# Patient Record
Sex: MALE | Race: WHITE | NOT HISPANIC OR LATINO | Employment: FULL TIME | URBAN - METROPOLITAN AREA
[De-identification: names, ages, dates, MRNs, and addresses within clinical notes are randomized per-mention and may not be internally consistent; named-entity substitution may affect disease eponyms.]

---

## 2020-10-20 ENCOUNTER — COMMUNICATION - HEALTHEAST (OUTPATIENT)
Dept: SCHEDULING | Facility: CLINIC | Age: 50
End: 2020-10-20

## 2020-10-21 ENCOUNTER — COMMUNICATION - HEALTHEAST (OUTPATIENT)
Dept: SCHEDULING | Facility: CLINIC | Age: 50
End: 2020-10-21

## 2020-10-21 ENCOUNTER — OFFICE VISIT - HEALTHEAST (OUTPATIENT)
Dept: INTERNAL MEDICINE | Facility: CLINIC | Age: 50
End: 2020-10-21

## 2020-10-21 DIAGNOSIS — Z86.0100 PERSONAL HISTORY OF COLONIC POLYPS: ICD-10-CM

## 2020-10-21 DIAGNOSIS — S42.002S CLOSED NONDISPLACED FRACTURE OF LEFT CLAVICLE, UNSPECIFIED PART OF CLAVICLE, SEQUELA: ICD-10-CM

## 2020-10-21 DIAGNOSIS — Z85.47 PERSONAL HISTORY OF TESTICULAR CANCER: ICD-10-CM

## 2020-10-21 DIAGNOSIS — Z80.0 FAMILY HISTORY OF COLON CANCER: ICD-10-CM

## 2020-10-21 DIAGNOSIS — Z00.00 ROUTINE GENERAL MEDICAL EXAMINATION AT A HEALTH CARE FACILITY: ICD-10-CM

## 2020-10-21 DIAGNOSIS — Z12.5 SCREENING PSA (PROSTATE SPECIFIC ANTIGEN): ICD-10-CM

## 2020-10-21 DIAGNOSIS — S27.321S CONTUSION OF LEFT LUNG, SEQUELA: ICD-10-CM

## 2020-10-21 DIAGNOSIS — S22.42XD CLOSED FRACTURE OF MULTIPLE RIBS OF LEFT SIDE WITH ROUTINE HEALING: ICD-10-CM

## 2020-10-21 RX ORDER — ACETAMINOPHEN 500 MG
TABLET ORAL
Status: SHIPPED | COMMUNITY
Start: 2020-10-05 | End: 2021-08-11

## 2020-10-21 RX ORDER — CYCLOBENZAPRINE HCL 5 MG
5 TABLET ORAL 3 TIMES DAILY PRN
Qty: 30 TABLET | Refills: 2 | Status: SHIPPED | OUTPATIENT
Start: 2020-10-21 | End: 2021-08-11

## 2020-10-21 RX ORDER — IBUPROFEN 200 MG
TABLET ORAL
Status: SHIPPED | COMMUNITY
Start: 2020-10-05 | End: 2021-08-11

## 2020-10-21 ASSESSMENT — MIFFLIN-ST. JEOR: SCORE: 2062.81

## 2020-10-22 ENCOUNTER — COMMUNICATION - HEALTHEAST (OUTPATIENT)
Dept: INTERNAL MEDICINE | Facility: CLINIC | Age: 50
End: 2020-10-22

## 2020-10-26 ENCOUNTER — COMMUNICATION - HEALTHEAST (OUTPATIENT)
Dept: INTERNAL MEDICINE | Facility: CLINIC | Age: 50
End: 2020-10-26

## 2020-10-28 ENCOUNTER — AMBULATORY - HEALTHEAST (OUTPATIENT)
Dept: LAB | Facility: CLINIC | Age: 50
End: 2020-10-28

## 2020-10-28 ENCOUNTER — COMMUNICATION - HEALTHEAST (OUTPATIENT)
Dept: INTERNAL MEDICINE | Facility: CLINIC | Age: 50
End: 2020-10-28

## 2020-10-28 DIAGNOSIS — Z00.00 ROUTINE GENERAL MEDICAL EXAMINATION AT A HEALTH CARE FACILITY: ICD-10-CM

## 2020-10-28 DIAGNOSIS — Z12.5 SCREENING PSA (PROSTATE SPECIFIC ANTIGEN): ICD-10-CM

## 2020-10-28 LAB
ALBUMIN SERPL-MCNC: 4.2 G/DL (ref 3.5–5)
ALP SERPL-CCNC: 119 U/L (ref 45–120)
ALT SERPL W P-5'-P-CCNC: 21 U/L (ref 0–45)
ANION GAP SERPL CALCULATED.3IONS-SCNC: 11 MMOL/L (ref 5–18)
AST SERPL W P-5'-P-CCNC: 19 U/L (ref 0–40)
BILIRUB SERPL-MCNC: 0.4 MG/DL (ref 0–1)
BUN SERPL-MCNC: 14 MG/DL (ref 8–22)
CALCIUM SERPL-MCNC: 9.9 MG/DL (ref 8.5–10.5)
CHLORIDE BLD-SCNC: 104 MMOL/L (ref 98–107)
CHOLEST SERPL-MCNC: 264 MG/DL
CO2 SERPL-SCNC: 26 MMOL/L (ref 22–31)
CREAT SERPL-MCNC: 0.78 MG/DL (ref 0.7–1.3)
ERYTHROCYTE [DISTWIDTH] IN BLOOD BY AUTOMATED COUNT: 11.9 % (ref 11–14.5)
FASTING STATUS PATIENT QL REPORTED: YES
GFR SERPL CREATININE-BSD FRML MDRD: >60 ML/MIN/1.73M2
GLUCOSE BLD-MCNC: 96 MG/DL (ref 70–125)
HCT VFR BLD AUTO: 39.6 % (ref 40–54)
HDLC SERPL-MCNC: 60 MG/DL
HGB BLD-MCNC: 13.2 G/DL (ref 14–18)
LDLC SERPL CALC-MCNC: 176 MG/DL
MCH RBC QN AUTO: 30.7 PG (ref 27–34)
MCHC RBC AUTO-ENTMCNC: 33.3 G/DL (ref 32–36)
MCV RBC AUTO: 92 FL (ref 80–100)
PLATELET # BLD AUTO: 340 THOU/UL (ref 140–440)
PMV BLD AUTO: 7.3 FL (ref 7–10)
POTASSIUM BLD-SCNC: 4.6 MMOL/L (ref 3.5–5)
PROT SERPL-MCNC: 7.5 G/DL (ref 6–8)
PSA SERPL-MCNC: 0.6 NG/ML (ref 0–3.5)
RBC # BLD AUTO: 4.28 MILL/UL (ref 4.4–6.2)
SODIUM SERPL-SCNC: 141 MMOL/L (ref 136–145)
TRIGL SERPL-MCNC: 138 MG/DL
WBC: 6.6 THOU/UL (ref 4–11)

## 2020-11-18 ENCOUNTER — OFFICE VISIT - HEALTHEAST (OUTPATIENT)
Dept: INTERNAL MEDICINE | Facility: CLINIC | Age: 50
End: 2020-11-18

## 2020-11-18 DIAGNOSIS — E78.5 HYPERLIPIDEMIA LDL GOAL <100: ICD-10-CM

## 2020-11-18 DIAGNOSIS — S42.002S CLOSED NONDISPLACED FRACTURE OF LEFT CLAVICLE, UNSPECIFIED PART OF CLAVICLE, SEQUELA: ICD-10-CM

## 2020-11-18 DIAGNOSIS — S22.42XD CLOSED FRACTURE OF MULTIPLE RIBS OF LEFT SIDE WITH ROUTINE HEALING: ICD-10-CM

## 2020-11-18 DIAGNOSIS — S27.321S CONTUSION OF LEFT LUNG, SEQUELA: ICD-10-CM

## 2020-12-14 ENCOUNTER — OFFICE VISIT - HEALTHEAST (OUTPATIENT)
Dept: PHYSICAL THERAPY | Facility: REHABILITATION | Age: 50
End: 2020-12-14

## 2020-12-14 DIAGNOSIS — M25.519 PAIN IN JOINT, SHOULDER REGION: ICD-10-CM

## 2020-12-14 DIAGNOSIS — S42.002A CLOSED FRACTURE OF LEFT CLAVICLE: ICD-10-CM

## 2020-12-14 DIAGNOSIS — S22.42XD CLOSED FRACTURE OF MULTIPLE RIBS OF LEFT SIDE WITH ROUTINE HEALING: ICD-10-CM

## 2020-12-14 DIAGNOSIS — M25.512 PAIN IN JOINT OF LEFT SHOULDER: ICD-10-CM

## 2020-12-14 DIAGNOSIS — M25.612 DECREASED RANGE OF MOTION OF LEFT SHOULDER: ICD-10-CM

## 2020-12-14 DIAGNOSIS — S42.002D CLOSED DISPLACED FRACTURE OF LEFT CLAVICLE WITH ROUTINE HEALING, UNSPECIFIED PART OF CLAVICLE, SUBSEQUENT ENCOUNTER: ICD-10-CM

## 2020-12-14 DIAGNOSIS — R07.81 RIB PAIN ON LEFT SIDE: ICD-10-CM

## 2020-12-17 ENCOUNTER — COMMUNICATION - HEALTHEAST (OUTPATIENT)
Dept: PHYSICAL THERAPY | Facility: REHABILITATION | Age: 50
End: 2020-12-17

## 2020-12-21 ENCOUNTER — OFFICE VISIT - HEALTHEAST (OUTPATIENT)
Dept: PHYSICAL THERAPY | Facility: REHABILITATION | Age: 50
End: 2020-12-21

## 2020-12-21 DIAGNOSIS — S42.002A CLOSED NONDISPLACED FRACTURE OF LEFT CLAVICLE, UNSPECIFIED PART OF CLAVICLE, INITIAL ENCOUNTER: ICD-10-CM

## 2020-12-21 DIAGNOSIS — S22.42XD CLOSED FRACTURE OF MULTIPLE RIBS OF LEFT SIDE WITH ROUTINE HEALING: ICD-10-CM

## 2020-12-21 DIAGNOSIS — R07.81 RIB PAIN ON LEFT SIDE: ICD-10-CM

## 2020-12-21 DIAGNOSIS — M25.612 DECREASED RANGE OF MOTION OF LEFT SHOULDER: ICD-10-CM

## 2020-12-21 DIAGNOSIS — M25.512 PAIN IN JOINT OF LEFT SHOULDER: ICD-10-CM

## 2020-12-28 ENCOUNTER — OFFICE VISIT - HEALTHEAST (OUTPATIENT)
Dept: PHYSICAL THERAPY | Facility: REHABILITATION | Age: 50
End: 2020-12-28

## 2020-12-28 DIAGNOSIS — R07.81 RIB PAIN ON LEFT SIDE: ICD-10-CM

## 2020-12-28 DIAGNOSIS — S22.42XD CLOSED FRACTURE OF MULTIPLE RIBS OF LEFT SIDE WITH ROUTINE HEALING: ICD-10-CM

## 2020-12-28 DIAGNOSIS — M25.612 DECREASED RANGE OF MOTION OF LEFT SHOULDER: ICD-10-CM

## 2020-12-28 DIAGNOSIS — M25.512 PAIN IN JOINT OF LEFT SHOULDER: ICD-10-CM

## 2020-12-28 DIAGNOSIS — S42.002A CLOSED NONDISPLACED FRACTURE OF LEFT CLAVICLE, UNSPECIFIED PART OF CLAVICLE, INITIAL ENCOUNTER: ICD-10-CM

## 2021-01-11 ENCOUNTER — COMMUNICATION - HEALTHEAST (OUTPATIENT)
Dept: PHYSICAL THERAPY | Facility: REHABILITATION | Age: 51
End: 2021-01-11

## 2021-01-25 ENCOUNTER — OFFICE VISIT - HEALTHEAST (OUTPATIENT)
Dept: PHYSICAL THERAPY | Facility: REHABILITATION | Age: 51
End: 2021-01-25

## 2021-01-25 DIAGNOSIS — S22.42XD CLOSED FRACTURE OF MULTIPLE RIBS OF LEFT SIDE WITH ROUTINE HEALING: ICD-10-CM

## 2021-01-25 DIAGNOSIS — S42.002A CLOSED NONDISPLACED FRACTURE OF LEFT CLAVICLE, UNSPECIFIED PART OF CLAVICLE, INITIAL ENCOUNTER: ICD-10-CM

## 2021-01-25 DIAGNOSIS — R07.81 RIB PAIN ON LEFT SIDE: ICD-10-CM

## 2021-01-25 DIAGNOSIS — M25.512 PAIN IN JOINT OF LEFT SHOULDER: ICD-10-CM

## 2021-01-25 DIAGNOSIS — M25.612 DECREASED RANGE OF MOTION OF LEFT SHOULDER: ICD-10-CM

## 2021-04-20 ENCOUNTER — AMBULATORY - HEALTHEAST (OUTPATIENT)
Dept: NURSING | Facility: CLINIC | Age: 51
End: 2021-04-20

## 2021-05-11 ENCOUNTER — AMBULATORY - HEALTHEAST (OUTPATIENT)
Dept: NURSING | Facility: CLINIC | Age: 51
End: 2021-05-11

## 2021-06-05 VITALS
BODY MASS INDEX: 27.5 KG/M2 | DIASTOLIC BLOOD PRESSURE: 86 MMHG | HEART RATE: 66 BPM | WEIGHT: 238 LBS | SYSTOLIC BLOOD PRESSURE: 120 MMHG | OXYGEN SATURATION: 97 %

## 2021-06-05 VITALS
WEIGHT: 238 LBS | SYSTOLIC BLOOD PRESSURE: 141 MMHG | DIASTOLIC BLOOD PRESSURE: 93 MMHG | HEART RATE: 68 BPM | OXYGEN SATURATION: 99 % | BODY MASS INDEX: 27.54 KG/M2 | HEIGHT: 78 IN

## 2021-06-12 NOTE — TELEPHONE ENCOUNTER
Who is calling:  Patient   Reason for Call:  Caller Date of birth is incorrect its suppose to be 1970 and not [birth year]. Caller is needing to know if Dr. Green received his hospital records from [place], since he has an appointment today with him to go over hospital follow up.   Date of last appointment with primary care:   Okay to leave a detailed message: Yes
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
## 2021-06-12 NOTE — TELEPHONE ENCOUNTER
Upcoming Appointment Question  When is the appointment: Tomorrow  What is your appointment for?: Post-op folllow-up  Who is your appointment scheduled with?: CHARMAINE Green  What is your question/concern?: Had hospital information faxed from [facility], is calling to confirm that the information has been received for his appointment.  Please confirm with patient.  Okay to leave a detailed message?: Yes

## 2021-06-12 NOTE — TELEPHONE ENCOUNTER
FYI - Status Update  Who is Calling: Spouse  Update: Rachel was checking the status of this request. Caller was informed Dr. Green documented yesterday (see the other encounter from 10/20) that he did receive the notes from [place]. Caller verbalized her understanding and had no further question.  Okay to leave a detailed message?:  No return call needed
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
## 2021-06-12 NOTE — TELEPHONE ENCOUNTER
Left message for patient that we have these records. Registration will fix  when patient gets here for appointment.  Yanelis Daigle CMA ............... 2:41 PM, 10/21/20
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
## 2021-06-13 NOTE — PROGRESS NOTES
[facility] Daily Progress     Patient Name: Woody Clancy  Date: 12/21/2020  Visit #: 2/12  Referring Provider: Giuseppe Green MD  Referring Diagnosis:   Closed nondisplaced fracture of left clavicle, unspecified part of clavicle, sequela [S42.002S]  - Primary       Closed fracture of multiple ribs of left side with routine healing [S22.42XD]         Visit Diagnosis:     ICD-10-CM    1. Pain in joint of left shoulder  M25.512    2. Rib pain on left side  R07.81    3. Closed fracture of multiple ribs of left side with routine healing  S22.42XD    4. Decreased range of motion of left shoulder  M25.612    5. Closed nondisplaced fracture of left clavicle, unspecified part of clavicle, initial encounter  S42.002A        Assessment:     Pt with good improvement to L shoulder ROM with 1 week with HEP with minimal increase to pain short term.    Pt continues to demo's L shoulder elevation difficulty with AC jt and ribs.    Patient is benefitting from skilled physical therapy and is making steady progress toward functional goals.  Patient is appropriate to continue with skilled physical therapy intervention, as indicated by initial plan of care.    Goal Status:  Pt. will demonstrate/verbalize independence in self-management of condition in : 12 weeks;Comment - IMPROVING - pt reports compliance    Pt. will have improved quality of sleep: with less pain;in 12 weeks;Comment  Comment:: be able to lay on his L side without difficulty in 12 weeks for improved QOL - on-going - still painful    Patient will reach / maintain arm movement: forward;overhead;behind;for work;for home chores;for dressing;with less pain;with less difficulty;in 12 weeks;Comment  Comment: with full range of motion for ease with ADLs with L arm - IMPROVING - better range    Pt will: be able to bend and twist without difficulty and with pain <1/10 for improved quality with functional mobility in 12 weeks. - on going      Plan / Patient  Education:     Continue with initial plan of care.  Assess response to adding s/l scap plane abd.  Reassess L shoulder and thoracic ROM and progress with AROM as able.    Exercises:  Exercise #1: Supine shoulder flexion AAROM x10 reps 2-3x/day  Comment #1: Standing scap plane abd AAROM with cane x10 reps 2-3x/day  Exercise #2: Standing AAROM shoulder ER x10 reps 2-3x/day  Comment #2: S/L L shoulder scap plane abd x10 reps 1-2x/day          Subjective:     Pain Ratin/10  Pt reports his L shoulder was a little achy with starting new exercises but this feeling has gotten better since practicing.  Pt can still feel some rib pain with movements but not too bad.    Patient Outcome Measures:  Shoulder Pain and Disability Index (SPADI) in %: 9     Scores range from 0-100%, where a score of 0% represents minimal pain and maximal function. The minimal clincically important difference is a score reduction of 10%.    Objective:      L shoulder flex 117 degrees with tightness end range, abd 114 degrees with tightness and pain in ribs and shoulder, ER 65 degrees with tightness (was same), IR T10 with tightness and min arm pain  (was same)    Treatment Today      TREATMENT MINUTES COMMENTS   Evaluation     Self-care/ Home management     Manual therapy     Neuromuscular Re-education     Therapeutic Activity     Therapeutic Exercises 28 Assessed response to HEP and activity level. Reassessed L shoulder ROM and discussed progress.  Performed PROM supine L shoulder scap plane abd, ER/IR and s/l scap protraction/retraction x20 reps x2 each. Added to HEP per pt instructions and flow sheet and printed AVS for home.   Gait training     Modality__________________                Total 28    Blank areas are intentional and mean the treatment did not include these items.       Marry Drake PT, DPT, OCS, CLT  2020  3:37 PM

## 2021-06-14 NOTE — PROGRESS NOTES
[facility] Daily Progress     Patient Name: Woody Clancy  Date: 12/28/2020  Visit #: 3/12  Referring Provider: Giuseppe Green MD  Referring Diagnosis:   Closed nondisplaced fracture of left clavicle, unspecified part of clavicle, sequela [S42.002S]  - Primary       Closed fracture of multiple ribs of left side with routine healing [S22.42XD]         Visit Diagnosis:     ICD-10-CM    1. Pain in joint of left shoulder  M25.512    2. Rib pain on left side  R07.81    3. Closed fracture of multiple ribs of left side with routine healing  S22.42XD    4. Decreased range of motion of left shoulder  M25.612    5. Closed nondisplaced fracture of left clavicle, unspecified part of clavicle, initial encounter  S42.002A        Assessment:     Pt with continued improvement to L shoulder ROM with 2 weeks with HEP with minimal increase to pain short term.    Pt continues to demo's L shoulder elevation difficulty with AC jt and rib/trunk stiffness.    Patient is benefitting from skilled physical therapy and is making steady progress toward functional goals.  Patient is appropriate to continue with skilled physical therapy intervention, as indicated by initial plan of care.    Goal Status:  Pt. will demonstrate/verbalize independence in self-management of condition in : 12 weeks;Comment - IMPROVING - pt reports compliance    Pt. will have improved quality of sleep: with less pain;in 12 weeks;Comment  Comment:: be able to lay on his L side without difficulty in 12 weeks for improved QOL - IMPROVING - pt is able to now sleep in bed but can get increased pain if he tries to lay on left side    Patient will reach / maintain arm movement: forward;overhead;behind;for work;for home chores;for dressing;with less pain;with less difficulty;in 12 weeks;Comment  Comment: with full range of motion for ease with ADLs with L arm - IMPROVING - better range    Pt will: be able to bend and twist without difficulty and with pain  <1/10 for improved quality with functional mobility in 12 weeks. - IMPROVING - has been bending with minimal pain and some tightness    Plan / Patient Education:     Continue with initial plan of care.  Assess response to adding child's pose and tricep stretch.    Reassess L shoulder and thoracic ROM and progress with AROM and strength and finalize program if doing well.    Exercises:  Exercise #1: Supine shoulder flexion AAROM x10 reps 2-3x/day  Comment #1: Standing scap plane abd AAROM with cane x10 reps 2-3x/day  Exercise #2: Standing AAROM shoulder ER x10 reps 2-3x/day  Comment #2: S/L L shoulder scap plane abd x10 reps 1-2x/day      Subjective:     Pain Ratin/10  Pt reports pain only bothers mildly with some of his stretches - otherwise not having difficulty with pain.  Pt feels like he continues to gain with his flexibility.  Pt is sleeping in bed now but can have pain if he lays on his left side.    Patient Outcome Measures:  Shoulder Pain and Disability Index (SPADI) in %: 9     Scores range from 0-100%, where a score of 0% represents minimal pain and maximal function. The minimal clincically important difference is a score reduction of 10%.    Objective:      L shoulder flex 135 degrees with tightness end range (was 110-117), abd 145 degrees with tightness and pain in ribs and shoulder (was ), ER 65 degrees with tightness (was same - R side around 70 today)), IR T10 with tightness and min arm pain (was same)    Tightness L tricep, lat dorsi    Treatment Today      TREATMENT MINUTES COMMENTS   Evaluation     Self-care/ Home management     Manual therapy     Neuromuscular Re-education     Therapeutic Activity     Therapeutic Exercises 28 Reassessed response to HEP and activity levesl. Reassessed L shoulder ROM and discussed progress.  Performed PROM with overpressure supine L shoulder scap plane abd, ER/IR x20 reps x2 each. Added to HEP per pt instructions and flow sheet and printed AVS for home.    Gait training     Modality__________________                Total 28    Blank areas are intentional and mean the treatment did not include these items.       Marry Drake PT, DPT, OCS, CLT  12/28/2020  3:37 PM

## 2021-06-14 NOTE — PROGRESS NOTES
Progress Notes by Marry Drake PT at 1/25/2021 12:30 PM     Author: Marry Drake PT Service: -- Author Type: Physical Therapist    Filed: 7/6/2021  3:30 PM Encounter Date: 1/25/2021 Status: Addendum    : Marry Drake PT (Physical Therapist)    Related Notes: Original Note by Marry Drake PT (Physical Therapist) filed at 1/25/2021  2:30 PM           [facility] Discharge Summary    Pt was trialing I with HEP x60 days with goals mostly met - see note below. Pt did not return to PT. Will discharge this episode of therapy at this time.  Thank you for this referral!  Marry Drake PT, DPT, OCS, CLT      [facility] Daily Progress   Patient Name: Woody Clancy  Date: 1/25/2021  Visit #: 4/12  Referring Provider: Giuseppe Green MD  Referring Diagnosis:   Closed nondisplaced fracture of left clavicle, unspecified part of clavicle, sequela [S42.002S]  - Primary       Closed fracture of multiple ribs of left side with routine healing [S22.42XD]         Visit Diagnosis:     ICD-10-CM    1. Pain in joint of left shoulder  M25.512    2. Rib pain on left side  R07.81    3. Closed fracture of multiple ribs of left side with routine healing  S22.42XD    4. Decreased range of motion of left shoulder  M25.612    5. Closed nondisplaced fracture of left clavicle, unspecified part of clavicle, initial encounter  S42.002A        Assessment:     Pt with great improvement L shoulder ROM to almost WNL and minimal pain with ADLs.    Pt continues to demo's L shoulder elevation difficulty with AC jt and rib/trunk stiffness.    HEP/POC compliance is  good .  Patient demonstrates understanding/independence with home program.  Response to Intervention Great!    Goal Status:  Pt. will demonstrate/verbalize independence in self-management of condition in : 12 weeks;Comment - IMPROVING - pt reports compliance    Pt. will have improved quality of sleep: with less pain;in 12  weeks;Comment  Comment:: be able to lay on his L side without difficulty in 12 weeks for improved QOL - IMPROVING - getting better and pt up to about 50% of the time on L side    Patient will reach / maintain arm movement: forward;overhead;behind;for work;for home chores;for dressing;with less pain;with less difficulty;in 12 weeks;Comment  Comment: with full range of motion for ease with ADLs with L arm - IMPROVING - better range    Pt will: be able to bend and twist without difficulty and with pain <1/10 for improved quality with functional mobility in 12 weeks. - mostly MET    Plan / Patient Education:     Continue with initial plan of care.  Hold x60 days as pt attempts I with HEP with continued daily emphasis on stretching. If pt doesn't need to return will discharge with pt I with HEP.  Thank you for this referral!    Exercises:  Exercise #1: Supine shoulder flexion AROM  with overpressure x10 reps 1x/day  Comment #1: Standing scap plane abd AROM with overpressure x10 reps 1x/day  Exercise #2: Standing AAROM shoulder ER in doorway with overpressure x10-20 seconds x2-3 reps 1x/day  Comment #2: S/L L shoulder scap plane abd x10 reps 1x/day  Exercise #3: Child's pose with L SB emphasis x10-20 seconds x2-3 reps 1x/day  Comment #3: Tricep stretch x10-20 seconds x2-3 reps 1-2x/day  Exercise #4: Lat dorsi stretch with counter top x10-20 seconds x2-3 reps 1x/day      Subjective:     Pain Ratin/10 most of the time - with an extended stretch to shoulder or trunk pt can get 1/10 pain in L shoulder.    Pt feels like he is doing really well and getting much better.    Patient Outcome Measures:  Shoulder Pain and Disability Index (SPADI) in %: 9     Scores range from 0-100%, where a score of 0% represents minimal pain and maximal function. The minimal clincically important difference is a score reduction of 10%.    Objective:      L shoulder flex 145 degrees with tightness end range (was 110-117), abd 155 degrees with  tightness and pain in ribs and shoulder (was ), ER 65 degrees with tightness (was same - R side around 70 today)), IR T10 with tightness and min arm pain (was same)    L shoulder and UE grossly 5/5 without pain or difficulty    Tightness L tricep, lat dorsi    Treatment Today      TREATMENT MINUTES COMMENTS   Evaluation     Self-care/ Home management     Manual therapy     Neuromuscular Re-education     Therapeutic Activity     Therapeutic Exercises 26 Reassessed response to HEP and activity level. Reassessed L shoulder ROM and strength and discussed great progress.  Performed and added to HEP per pt instructions and flow sheet and printed AVS for home. Emphasized daily stretching x3 more months and then long-term stretching 3-4x/week life long. Recommended light strengthening prior to increasing heavy activity with yard work come spring.   Gait training     Modality__________________                Total 26    Blank areas are intentional and mean the treatment did not include these items.       Marry Drake PT, DPT, OCS, CLT  1/25/2021  3:37 PM

## 2021-06-16 PROBLEM — E78.5 HYPERLIPIDEMIA LDL GOAL <100: Status: ACTIVE | Noted: 2020-11-18

## 2021-06-16 PROBLEM — Z86.0100 PERSONAL HISTORY OF COLONIC POLYPS: Status: ACTIVE | Noted: 2020-10-21

## 2021-06-16 PROBLEM — Z85.47 PERSONAL HISTORY OF TESTICULAR CANCER: Status: ACTIVE | Noted: 2020-10-21

## 2021-06-16 PROBLEM — S27.321A CONTUSION OF LEFT LUNG: Status: ACTIVE | Noted: 2020-10-21

## 2021-06-16 PROBLEM — S22.42XD CLOSED FRACTURE OF MULTIPLE RIBS OF LEFT SIDE WITH ROUTINE HEALING: Status: ACTIVE | Noted: 2020-10-21

## 2021-06-16 PROBLEM — Z80.0 FAMILY HISTORY OF COLON CANCER: Status: ACTIVE | Noted: 2020-10-21

## 2021-06-16 PROBLEM — S42.002A CLOSED FRACTURE OF LEFT CLAVICLE: Status: ACTIVE | Noted: 2020-10-21

## 2021-06-18 NOTE — PATIENT INSTRUCTIONS - HE
Patient Instructions by Marry Drake PT at 1/25/2021 12:30 PM     Author: Marry Drake PT Service: -- Author Type: Physical Therapist    Filed: 1/25/2021  1:01 PM Encounter Date: 1/25/2021 Status: Signed    : Marry Drake PT (Physical Therapist)       Trying to keep this up daily until April and then long term 3-4x/week.     WAND FLEXION  - SUPINE - no stick needed!    Lying on  your back and holding a wand or cane, slowly raise the wand towards overhead.  x10 reps  1x/day  1 second pause at top    Reach R hand over to L wrist and add a little overpressure.  Trying to keep this up daily until April and then long term 3-4x/week.    WAND ABDUCTION - STANDING - NO STICK NEEDED!    While holding a wand/cane palm face up on the injured side and palm face down on the uninjured side, slowly raise up your injured arm to the side.  x10 reps 1x/day  1 second pause at top        STANDING ER DOORWAY STRETCH    Face the doorjam with elbow bent 90  and hand on wall.  Slowly try to turn your body open into the doorway to get a stretch in the shoulder.  Hold x 10-20 seconds  Perform 2-3 reps - 1 x/day           AROM SIDELYING ABDUCTION    While lying on your side and arm at your side, slowly raise up the arm towards overhead and away from your side.  x5-10 reps - as far as feels to edge of tension  1x/day        WALL WALK    Place your affected hand on the wall with the palm facing the wall. Next, walk your fingers up the wall towards overhead. Lastly, slide your hand back down the wall to the starting position.    Place feet staggered so you feel in control of how much body weight pressure you put into the stretch  x5-10 reps   2-3x/day  1 second pause at top         Stretch away from table/counter x10-20 seconds x2-3 reps 1x/day           KIM POSE WITH SIDE STRETCH    Sit back on your heels with knees apart. Reach forward walking your hands on the mat/ground in front of you. You can also walk your LEFT HAND  TOWARDS RIGHT SIDE while sitting back on your heels to get a stretch on the left side of your low back/trunk.  ALSO can shift hips towards left side to feel stretch on Left side back.    Hold 10-20 seconds, breathing deeply further into the stretch.    Repeat 2-3 times.  1-2x/day       TRICEP STRETCH    Try to grab Left elbow with R hand and stretch elbow up and back x10-20 seconds x2-3 reps 1x/day

## 2021-06-18 NOTE — PATIENT INSTRUCTIONS - HE
Patient Instructions by Marry Drake PT at 12/14/2020 11:00 AM     Author: Marry Drake PT Service: -- Author Type: Physical Therapist    Filed: 12/14/2020 11:55 AM Encounter Date: 12/14/2020 Status: Signed    : Marry Drake PT (Physical Therapist)        WAND FLEXION  - SUPINE    Lying on  your back and holding a wand or cane, slowly raise the wand towards overhead.  x10 reps  2-3x/day  1 second pause at top    WAND ABDUCTION - STANDING    While holding a wand/cane palm face up on the injured side and palm face down on the uninjured side, slowly raise up your injured arm to the side.  x10 reps 2-3x/day  1 second pause at top    WAND EXTERNAL ROTATION - SUPINE    Lying on your back and holding a wand, palm face up the injured side and palm face down on the uninjured, push the wand to the side and let your injured shoulder roll outward.    STANDING Left elbow stays by ribs - rotate left hand out the side x10 reps   2-3x/day  1 second pause at top      WALL WALK    Place your affected hand on the wall with the palm facing the wall. Next, walk your fingers up the wall towards overhead. Lastly, slide your hand back down the wall to the starting position.    Place feet staggered so you feel in control of how much body weight pressure you put into the stretch  x5-10 reps   2-3x/day  1 second pause at top

## 2021-06-18 NOTE — PATIENT INSTRUCTIONS - HE
Patient Instructions by Marry Drake PT at 12/21/2020  3:30 PM     Author: Marry Drake PT Service: -- Author Type: Physical Therapist    Filed: 12/21/2020  3:59 PM Encounter Date: 12/21/2020 Status: Signed    : Marry Drake PT (Physical Therapist)        WAND FLEXION  - SUPINE    Lying on  your back and holding a wand or cane, slowly raise the wand towards overhead.  x10 reps  2-3x/day  1 second pause at top    WAND ABDUCTION - STANDING    While holding a wand/cane palm face up on the injured side and palm face down on the uninjured side, slowly raise up your injured arm to the side.  x10 reps 2-3x/day  1 second pause at top    WAND EXTERNAL ROTATION - SUPINE    Lying on your back and holding a wand, palm face up the injured side and palm face down on the uninjured, push the wand to the side and let your injured shoulder roll outward.    STANDING Left elbow stays by ribs - rotate left hand out the side x10 reps   2-3x/day  1 second pause at top      AROM SIDELYING ABDUCTION    While lying on your side and arm at your side, slowly raise up the arm towards overhead and away from your side.  x5-10 reps - as far as feels to edge of tension  1-2x/day        WALL WALK    Place your affected hand on the wall with the palm facing the wall. Next, walk your fingers up the wall towards overhead. Lastly, slide your hand back down the wall to the starting position.    Place feet staggered so you feel in control of how much body weight pressure you put into the stretch  x5-10 reps   2-3x/day  1 second pause at top

## 2021-06-18 NOTE — PATIENT INSTRUCTIONS - HE
Patient Instructions by Marry Drake PT at 12/28/2020 11:30 AM     Author: Marry Drake PT Service: -- Author Type: Physical Therapist    Filed: 12/28/2020 12:03 PM Encounter Date: 12/28/2020 Status: Signed    : Marry Drake PT (Physical Therapist)        WAND FLEXION  - SUPINE    Lying on  your back and holding a wand or cane, slowly raise the wand towards overhead.  x10 reps  2-3x/day  1 second pause at top    WAND ABDUCTION - STANDING    While holding a wand/cane palm face up on the injured side and palm face down on the uninjured side, slowly raise up your injured arm to the side.  x10 reps 2-3x/day  1 second pause at top    WAND EXTERNAL ROTATION - SUPINE    Lying on your back and holding a wand, palm face up the injured side and palm face down on the uninjured, push the wand to the side and let your injured shoulder roll outward.    STANDING Left elbow stays by ribs - rotate left hand out the side x10 reps   2-3x/day  1 second pause at top      AROM SIDELYING ABDUCTION    While lying on your side and arm at your side, slowly raise up the arm towards overhead and away from your side.  x5-10 reps - as far as feels to edge of tension  1-2x/day        WALL WALK    Place your affected hand on the wall with the palm facing the wall. Next, walk your fingers up the wall towards overhead. Lastly, slide your hand back down the wall to the starting position.    Place feet staggered so you feel in control of how much body weight pressure you put into the stretch  x5-10 reps   2-3x/day  1 second pause at top              KIM POSE WITH SIDE STRETCH    Sit back on your heels with knees apart. Reach forward walking your hands on the mat/ground in front of you. You can also walk your LEFT HAND TOWARDS RIGHT SIDE while sitting back on your heels to get a stretch on the left side of your low back/trunk.  ALSO can shift hips towards left side to feel stretch on Left side back.    Hold 10-20 seconds, breathing  deeply further into the stretch.    Repeat 2-3 times.  1-2x/day     TRICEP STRETCH    Try to grab Left elbow with R hand and stretch elbow up and back x10-20 seconds x2-3 reps 1-2x/day

## 2021-06-26 ENCOUNTER — HEALTH MAINTENANCE LETTER (OUTPATIENT)
Age: 51
End: 2021-06-26

## 2021-07-13 ENCOUNTER — TELEPHONE (OUTPATIENT)
Dept: INTERNAL MEDICINE | Facility: CLINIC | Age: 51
End: 2021-07-13

## 2021-07-13 NOTE — TELEPHONE ENCOUNTER
Appointment request received on [facility] side of [facility]. Patient is requesting an appointment between the dates of 7/12/21-7/16/21 in the morning for cholesterol recheck and stomach concerns (larger on right side).    Left voicemail for patient to return call to clinic. When patient returns call, please give them below message.    Please help patient schedule.This can be a regular follow up.    Yanelis Daigle CMA ............... 4:52 PM, 07/13/21

## 2021-07-21 ENCOUNTER — APPOINTMENT (OUTPATIENT)
Dept: URGENT CARE | Facility: CLINIC | Age: 51
End: 2021-07-21
Payer: COMMERCIAL

## 2021-07-22 NOTE — TELEPHONE ENCOUNTER
Looks like patient is scheduled with Dr. Green for 8/11/21. Closing encounter.  Yanelis Daigle CMA ............... 5:09 PM, 07/22/21

## 2021-08-11 ENCOUNTER — OFFICE VISIT (OUTPATIENT)
Dept: INTERNAL MEDICINE | Facility: CLINIC | Age: 51
End: 2021-08-11
Payer: COMMERCIAL

## 2021-08-11 VITALS
DIASTOLIC BLOOD PRESSURE: 72 MMHG | BODY MASS INDEX: 27.26 KG/M2 | TEMPERATURE: 97.6 F | OXYGEN SATURATION: 97 % | SYSTOLIC BLOOD PRESSURE: 122 MMHG | WEIGHT: 235.6 LBS | HEIGHT: 78 IN | HEART RATE: 60 BPM

## 2021-08-11 DIAGNOSIS — Z23 TETANUS TOXOID INOCULATION: ICD-10-CM

## 2021-08-11 DIAGNOSIS — Z85.47 PERSONAL HISTORY OF TESTICULAR CANCER: ICD-10-CM

## 2021-08-11 DIAGNOSIS — Z12.5 SCREENING PSA (PROSTATE SPECIFIC ANTIGEN): ICD-10-CM

## 2021-08-11 DIAGNOSIS — L91.0 KELOID SCAR: ICD-10-CM

## 2021-08-11 DIAGNOSIS — E78.5 HYPERLIPIDEMIA LDL GOAL <100: ICD-10-CM

## 2021-08-11 DIAGNOSIS — Z00.00 ROUTINE GENERAL MEDICAL EXAMINATION AT A HEALTH CARE FACILITY: Primary | ICD-10-CM

## 2021-08-11 PROCEDURE — 90471 IMMUNIZATION ADMIN: CPT | Performed by: INTERNAL MEDICINE

## 2021-08-11 PROCEDURE — 90714 TD VACC NO PRESV 7 YRS+ IM: CPT | Performed by: INTERNAL MEDICINE

## 2021-08-11 PROCEDURE — 99396 PREV VISIT EST AGE 40-64: CPT | Mod: 25 | Performed by: INTERNAL MEDICINE

## 2021-08-11 ASSESSMENT — MIFFLIN-ST. JEOR: SCORE: 2063.83
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
## 2021-08-20 ENCOUNTER — LAB (OUTPATIENT)
Dept: LAB | Facility: CLINIC | Age: 51
End: 2021-08-20
Payer: COMMERCIAL

## 2021-08-20 DIAGNOSIS — Z85.47 PERSONAL HISTORY OF TESTICULAR CANCER: ICD-10-CM

## 2021-08-20 DIAGNOSIS — E78.5 HYPERLIPIDEMIA LDL GOAL <100: ICD-10-CM

## 2021-08-20 DIAGNOSIS — Z12.5 SCREENING PSA (PROSTATE SPECIFIC ANTIGEN): ICD-10-CM

## 2021-08-20 DIAGNOSIS — Z00.00 ROUTINE GENERAL MEDICAL EXAMINATION AT A HEALTH CARE FACILITY: ICD-10-CM

## 2021-08-20 LAB
AFP SERPL-MCNC: 3.3 NG/ML
ALBUMIN SERPL-MCNC: 4.2 G/DL (ref 3.5–5)
ALP SERPL-CCNC: 54 U/L (ref 45–120)
ALT SERPL W P-5'-P-CCNC: 24 U/L (ref 0–45)
ANION GAP SERPL CALCULATED.3IONS-SCNC: 10 MMOL/L (ref 5–18)
AST SERPL W P-5'-P-CCNC: 23 U/L (ref 0–40)
BILIRUB SERPL-MCNC: 0.5 MG/DL (ref 0–1)
BUN SERPL-MCNC: 11 MG/DL (ref 8–22)
CALCIUM SERPL-MCNC: 9.8 MG/DL (ref 8.5–10.5)
CHLORIDE BLD-SCNC: 103 MMOL/L (ref 98–107)
CHOLEST SERPL-MCNC: 252 MG/DL
CO2 SERPL-SCNC: 27 MMOL/L (ref 22–31)
CREAT SERPL-MCNC: 0.86 MG/DL (ref 0.7–1.3)
ERYTHROCYTE [DISTWIDTH] IN BLOOD BY AUTOMATED COUNT: 12 % (ref 10–15)
FASTING STATUS PATIENT QL REPORTED: YES
GFR SERPL CREATININE-BSD FRML MDRD: >90 ML/MIN/1.73M2
GLUCOSE BLD-MCNC: 91 MG/DL (ref 70–125)
HCT VFR BLD AUTO: 39.9 % (ref 40–53)
HDLC SERPL-MCNC: 76 MG/DL
HGB BLD-MCNC: 13.3 G/DL (ref 13.3–17.7)
LDLC SERPL CALC-MCNC: 142 MG/DL
MCH RBC QN AUTO: 30.4 PG (ref 26.5–33)
MCHC RBC AUTO-ENTMCNC: 33.3 G/DL (ref 31.5–36.5)
MCV RBC AUTO: 91 FL (ref 78–100)
PLATELET # BLD AUTO: 273 10E3/UL (ref 150–450)
POTASSIUM BLD-SCNC: 4.2 MMOL/L (ref 3.5–5)
PROT SERPL-MCNC: 7.7 G/DL (ref 6–8)
PSA SERPL-MCNC: 0.58 UG/L (ref 0–3.5)
RBC # BLD AUTO: 4.38 10E6/UL (ref 4.4–5.9)
SODIUM SERPL-SCNC: 140 MMOL/L (ref 136–145)
TRIGL SERPL-MCNC: 171 MG/DL
TSH SERPL DL<=0.005 MIU/L-ACNC: 3.01 UIU/ML (ref 0.3–5)
WBC # BLD AUTO: 5.5 10E3/UL (ref 4–11)

## 2021-08-20 PROCEDURE — 84702 CHORIONIC GONADOTROPIN TEST: CPT

## 2021-08-20 PROCEDURE — 80053 COMPREHEN METABOLIC PANEL: CPT

## 2021-08-20 PROCEDURE — 36415 COLL VENOUS BLD VENIPUNCTURE: CPT

## 2021-08-20 PROCEDURE — G0103 PSA SCREENING: HCPCS

## 2021-08-20 PROCEDURE — 82105 ALPHA-FETOPROTEIN SERUM: CPT

## 2021-08-20 PROCEDURE — 84443 ASSAY THYROID STIM HORMONE: CPT

## 2021-08-20 PROCEDURE — 80061 LIPID PANEL: CPT

## 2021-08-20 PROCEDURE — 85027 COMPLETE CBC AUTOMATED: CPT

## 2021-08-23 LAB — HCG-TM SERPL-ACNC: <3 IU/L

## 2021-10-16 ENCOUNTER — HEALTH MAINTENANCE LETTER (OUTPATIENT)
Age: 51
End: 2021-10-16

## 2022-01-02 ENCOUNTER — MYC MEDICAL ADVICE (OUTPATIENT)
Dept: INTERNAL MEDICINE | Facility: CLINIC | Age: 52
End: 2022-01-02
Payer: COMMERCIAL

## 2022-01-02 DIAGNOSIS — H90.5 UNILATERAL SENSORINEURAL HEARING LOSS: Primary | ICD-10-CM

## 2022-02-10 ENCOUNTER — OFFICE VISIT (OUTPATIENT)
Dept: AUDIOLOGY | Facility: CLINIC | Age: 52
End: 2022-02-10
Attending: INTERNAL MEDICINE
Payer: COMMERCIAL

## 2022-02-10 ENCOUNTER — OFFICE VISIT (OUTPATIENT)
Dept: OTOLARYNGOLOGY | Facility: CLINIC | Age: 52
End: 2022-02-10
Attending: INTERNAL MEDICINE
Payer: COMMERCIAL

## 2022-02-10 DIAGNOSIS — H90.3 ASYMMETRICAL SENSORINEURAL HEARING LOSS: Primary | ICD-10-CM

## 2022-02-10 DIAGNOSIS — H90.A22 SENSORINEURAL HEARING LOSS (SNHL) OF LEFT EAR WITH RESTRICTED HEARING OF RIGHT EAR: Primary | ICD-10-CM

## 2022-02-10 PROCEDURE — 92557 COMPREHENSIVE HEARING TEST: CPT | Performed by: AUDIOLOGIST

## 2022-02-10 PROCEDURE — 99207 PR NO CHARGE LOS: CPT | Performed by: AUDIOLOGIST

## 2022-02-10 PROCEDURE — 99203 OFFICE O/P NEW LOW 30 MIN: CPT | Performed by: OTOLARYNGOLOGY

## 2022-02-10 PROCEDURE — 92550 TYMPANOMETRY & REFLEX THRESH: CPT | Mod: 52 | Performed by: AUDIOLOGIST

## 2022-02-10 NOTE — LETTER
2/10/2022         RE: Woody Clancy  [address] [place] [address]        Dear Colleague,    Thank you for referring your patient, Woody Clancy, to the [facility]. Please see a copy of my visit note below.    HPI: This patient is a 52yo M who presents for evaluation of hearing at the request of Dr. Green. There has been a gradual loss of hearing over the past few years, more in the left. He is an avid vane and does shoot right-handed. He tries to wear hearing protection, but does not always have it in. Denies otalgia, otorrhea, vertigo, and other major medical issues. Has been around moderate noise and has no relevant family history. There is bilateral, non-pulsatile tinnitus, most noticeable when it is quiet.    Past medical history, surgical history, social history, family history, medications, and allergies have been reviewed with the patient and are documented above.    Review of Systems: a 10-system review was performed. Pertinent positives are noted in the HPI and on a separate scanned document in the chart.    PHYSICAL EXAMINATION:  GEN: no acute distress, normocephalic  EYES: extraocular movements are intact, pupils are equal and round. Sclera clear.   EARS: auricles are normally formed. The external auditory canals are clear with minimal to no cerumen. Tympanic membranes are intact bilaterally with no signs of infection, effusion, retractions, or perforations.  NOSE: anterior nares are patent. There are no masses or lesions. The septum is non-obstructing.  OC/OP: clear, dentition is in good repair. The tongue and palate are fully mobile and symmetric. No masses or lesions.  NECK: soft and supple. No lymphadenopathy or masses. Airway is midline.  NEURO: CN VII and XII symmetric. alert and oriented. No spontaneous nystagmus. Gait is normal.  PULM: breathing comfortably on room air, normal chest expansion with respiration  CARDS: no cyanosis or clubbing, normal  carotid pulses    AUDIOGRAM: mild sloping HFSNHL, mild to moderate sloping SNHL. Type A tymps. 100% WRS.    MEDICAL DECISION-MAKING: This patient is a 52yo M with asymmetric sensorineural hearing loss. Discussed hearing protection. Medically clear for hearing aids should the patient desire them. His asymmetry could certainly be explained by his hunting, but did discuss that the gold standard for workup is an MRI to evaluate for a benign acoustic. He understands that but has chosen to repeat the audiogram in 6 months as an alternative.         Again, thank you for allowing me to participate in the care of your patient.        Sincerely,        Raina Zamora MD

## 2022-02-10 NOTE — PROGRESS NOTES
AUDIOLOGY REPORT    SUMMARY: Audiology visit completed. See audiogram for results. Abuse screening not completed due to same day appt with ENT clinic, where this is addressed.      RECOMMENDATIONS: Follow-up with ENT.      Deborah Hernandez, [facility]  [place] Licensed Audiologist [id number]

## 2022-02-10 NOTE — PROGRESS NOTES
HPI: This patient is a 52yo M who presents for evaluation of hearing at the request of Dr. Green. There has been a gradual loss of hearing over the past few years, more in the left. He is an avid vane and does shoot right-handed. He tries to wear hearing protection, but does not always have it in. Denies otalgia, otorrhea, vertigo, and other major medical issues. Has been around moderate noise and has no relevant family history. There is bilateral, non-pulsatile tinnitus, most noticeable when it is quiet.    Past medical history, surgical history, social history, family history, medications, and allergies have been reviewed with the patient and are documented above.    Review of Systems: a 10-system review was performed. Pertinent positives are noted in the HPI and on a separate scanned document in the chart.    PHYSICAL EXAMINATION:  GEN: no acute distress, normocephalic  EYES: extraocular movements are intact, pupils are equal and round. Sclera clear.   EARS: auricles are normally formed. The external auditory canals are clear with minimal to no cerumen. Tympanic membranes are intact bilaterally with no signs of infection, effusion, retractions, or perforations.  NOSE: anterior nares are patent. There are no masses or lesions. The septum is non-obstructing.  OC/OP: clear, dentition is in good repair. The tongue and palate are fully mobile and symmetric. No masses or lesions.  NECK: soft and supple. No lymphadenopathy or masses. Airway is midline.  NEURO: CN VII and XII symmetric. alert and oriented. No spontaneous nystagmus. Gait is normal.  PULM: breathing comfortably on room air, normal chest expansion with respiration  CARDS: no cyanosis or clubbing, normal carotid pulses    AUDIOGRAM: mild sloping HFSNHL, mild to moderate sloping SNHL. Type A tymps. 100% WRS.    MEDICAL DECISION-MAKING: This patient is a 52yo M with asymmetric sensorineural hearing loss. Discussed hearing protection. Medically clear for  hearing aids should the patient desire them. His asymmetry could certainly be explained by his hunting, but did discuss that the gold standard for workup is an MRI to evaluate for a benign acoustic. He understands that but has chosen to repeat the audiogram in 6 months as an alternative.

## 2022-10-01 ENCOUNTER — HEALTH MAINTENANCE LETTER (OUTPATIENT)
Age: 52
End: 2022-10-01

## 2023-02-07 ASSESSMENT — ENCOUNTER SYMPTOMS
DIZZINESS: 0
SORE THROAT: 0
DIARRHEA: 0
FEVER: 0
COUGH: 0
FREQUENCY: 0
SHORTNESS OF BREATH: 0
EYE PAIN: 0
MYALGIAS: 0
PALPITATIONS: 0
HEMATOCHEZIA: 0
HEARTBURN: 0
PARESTHESIAS: 0
HEMATURIA: 0
ARTHRALGIAS: 0
NAUSEA: 0
HEADACHES: 0
CHILLS: 0
CONSTIPATION: 0
WEAKNESS: 0
JOINT SWELLING: 0
ABDOMINAL PAIN: 0
DYSURIA: 0
NERVOUS/ANXIOUS: 0

## 2023-02-08 ENCOUNTER — OFFICE VISIT (OUTPATIENT)
Dept: INTERNAL MEDICINE | Facility: CLINIC | Age: 53
End: 2023-02-08
Payer: COMMERCIAL

## 2023-02-08 VITALS
BODY MASS INDEX: 28.69 KG/M2 | WEIGHT: 248 LBS | OXYGEN SATURATION: 98 % | HEART RATE: 80 BPM | TEMPERATURE: 98.4 F | DIASTOLIC BLOOD PRESSURE: 82 MMHG | HEIGHT: 78 IN | SYSTOLIC BLOOD PRESSURE: 128 MMHG | RESPIRATION RATE: 16 BRPM

## 2023-02-08 DIAGNOSIS — Z00.00 ROUTINE GENERAL MEDICAL EXAMINATION AT A HEALTH CARE FACILITY: Primary | ICD-10-CM

## 2023-02-08 DIAGNOSIS — Z23 HIGH PRIORITY FOR COVID-19 VACCINATION: ICD-10-CM

## 2023-02-08 DIAGNOSIS — L91.0 KELOID SCAR: ICD-10-CM

## 2023-02-08 DIAGNOSIS — Z12.5 SCREENING PSA (PROSTATE SPECIFIC ANTIGEN): ICD-10-CM

## 2023-02-08 DIAGNOSIS — E78.5 HYPERLIPIDEMIA LDL GOAL <100: ICD-10-CM

## 2023-02-08 DIAGNOSIS — Z86.0100 PERSONAL HISTORY OF COLONIC POLYPS: ICD-10-CM

## 2023-02-08 DIAGNOSIS — Z13.1 SCREENING FOR DIABETES MELLITUS: ICD-10-CM

## 2023-02-08 PROCEDURE — 99396 PREV VISIT EST AGE 40-64: CPT | Performed by: INTERNAL MEDICINE

## 2023-02-08 PROCEDURE — 0124A COVID-19 VACCINE BIVALENT BOOSTER 12+ (PFIZER): CPT | Performed by: INTERNAL MEDICINE

## 2023-02-08 PROCEDURE — 91312 COVID-19 VACCINE BIVALENT BOOSTER 12+ (PFIZER): CPT | Performed by: INTERNAL MEDICINE

## 2023-02-08 ASSESSMENT — ENCOUNTER SYMPTOMS
SORE THROAT: 0
FREQUENCY: 0
COUGH: 0
FEVER: 0
HEADACHES: 0
EYE PAIN: 0
SHORTNESS OF BREATH: 0
WEAKNESS: 0
NERVOUS/ANXIOUS: 0
ARTHRALGIAS: 0
HEMATURIA: 0
JOINT SWELLING: 0
DIARRHEA: 0
HEARTBURN: 0
DYSURIA: 0
MYALGIAS: 0
PALPITATIONS: 0
CHILLS: 0
HEMATOCHEZIA: 0
DIZZINESS: 0
ABDOMINAL PAIN: 0
PARESTHESIAS: 0
NAUSEA: 0
CONSTIPATION: 0
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
## 2023-02-21 ENCOUNTER — LAB (OUTPATIENT)
Dept: LAB | Facility: CLINIC | Age: 53
End: 2023-02-21
Payer: COMMERCIAL

## 2023-02-21 DIAGNOSIS — Z12.5 SCREENING PSA (PROSTATE SPECIFIC ANTIGEN): ICD-10-CM

## 2023-02-21 DIAGNOSIS — Z00.00 ROUTINE GENERAL MEDICAL EXAMINATION AT A HEALTH CARE FACILITY: ICD-10-CM

## 2023-02-21 DIAGNOSIS — E78.5 HYPERLIPIDEMIA LDL GOAL <100: ICD-10-CM

## 2023-02-21 DIAGNOSIS — Z13.1 SCREENING FOR DIABETES MELLITUS: ICD-10-CM

## 2023-02-21 LAB
ALBUMIN SERPL BCG-MCNC: 4.8 G/DL (ref 3.5–5.2)
ALP SERPL-CCNC: 51 U/L (ref 40–129)
ALT SERPL W P-5'-P-CCNC: 34 U/L (ref 10–50)
ANION GAP SERPL CALCULATED.3IONS-SCNC: 14 MMOL/L (ref 7–15)
AST SERPL W P-5'-P-CCNC: 43 U/L (ref 10–50)
BASOPHILS # BLD AUTO: 0.1 10E3/UL (ref 0–0.2)
BASOPHILS NFR BLD AUTO: 2 %
BILIRUB SERPL-MCNC: 0.5 MG/DL
BUN SERPL-MCNC: 16.3 MG/DL (ref 6–20)
CALCIUM SERPL-MCNC: 9.8 MG/DL (ref 8.6–10)
CHLORIDE SERPL-SCNC: 102 MMOL/L (ref 98–107)
CHOLEST SERPL-MCNC: 239 MG/DL
CREAT SERPL-MCNC: 0.91 MG/DL (ref 0.67–1.17)
DEPRECATED HCO3 PLAS-SCNC: 25 MMOL/L (ref 22–29)
EOSINOPHIL # BLD AUTO: 0.3 10E3/UL (ref 0–0.7)
EOSINOPHIL NFR BLD AUTO: 6 %
ERYTHROCYTE [DISTWIDTH] IN BLOOD BY AUTOMATED COUNT: 12.5 % (ref 10–15)
GFR SERPL CREATININE-BSD FRML MDRD: >90 ML/MIN/1.73M2
GLUCOSE SERPL-MCNC: 108 MG/DL (ref 70–99)
HBA1C MFR BLD: 5.7 % (ref 0–5.6)
HCT VFR BLD AUTO: 39.9 % (ref 40–53)
HDLC SERPL-MCNC: 96 MG/DL
HGB BLD-MCNC: 13.5 G/DL (ref 13.3–17.7)
IMM GRANULOCYTES # BLD: 0 10E3/UL
IMM GRANULOCYTES NFR BLD: 0 %
LDLC SERPL CALC-MCNC: 130 MG/DL
LYMPHOCYTES # BLD AUTO: 1.7 10E3/UL (ref 0.8–5.3)
LYMPHOCYTES NFR BLD AUTO: 37 %
MCH RBC QN AUTO: 31.3 PG (ref 26.5–33)
MCHC RBC AUTO-ENTMCNC: 33.8 G/DL (ref 31.5–36.5)
MCV RBC AUTO: 93 FL (ref 78–100)
MONOCYTES # BLD AUTO: 0.5 10E3/UL (ref 0–1.3)
MONOCYTES NFR BLD AUTO: 10 %
NEUTROPHILS # BLD AUTO: 2 10E3/UL (ref 1.6–8.3)
NEUTROPHILS NFR BLD AUTO: 44 %
NONHDLC SERPL-MCNC: 143 MG/DL
PLATELET # BLD AUTO: 252 10E3/UL (ref 150–450)
POTASSIUM SERPL-SCNC: 4.5 MMOL/L (ref 3.4–5.3)
PROT SERPL-MCNC: 7.8 G/DL (ref 6.4–8.3)
PSA SERPL-MCNC: 0.56 NG/ML (ref 0–3.5)
RBC # BLD AUTO: 4.31 10E6/UL (ref 4.4–5.9)
SODIUM SERPL-SCNC: 141 MMOL/L (ref 136–145)
TRIGL SERPL-MCNC: 67 MG/DL
TSH SERPL DL<=0.005 MIU/L-ACNC: 2.94 UIU/ML (ref 0.3–4.2)
WBC # BLD AUTO: 4.5 10E3/UL (ref 4–11)

## 2023-02-21 PROCEDURE — 80050 GENERAL HEALTH PANEL: CPT

## 2023-02-21 PROCEDURE — 80061 LIPID PANEL: CPT

## 2023-02-21 PROCEDURE — 36415 COLL VENOUS BLD VENIPUNCTURE: CPT

## 2023-02-21 PROCEDURE — G0103 PSA SCREENING: HCPCS

## 2023-02-21 PROCEDURE — 83036 HEMOGLOBIN GLYCOSYLATED A1C: CPT

## 2023-06-08 ENCOUNTER — OFFICE VISIT (OUTPATIENT)
Dept: INTERNAL MEDICINE | Facility: CLINIC | Age: 53
End: 2023-06-08
Payer: COMMERCIAL

## 2023-06-08 ENCOUNTER — ANCILLARY PROCEDURE (OUTPATIENT)
Dept: GENERAL RADIOLOGY | Facility: CLINIC | Age: 53
End: 2023-06-08
Attending: INTERNAL MEDICINE
Payer: COMMERCIAL

## 2023-06-08 VITALS
SYSTOLIC BLOOD PRESSURE: 126 MMHG | HEIGHT: 78 IN | WEIGHT: 250.3 LBS | TEMPERATURE: 97.8 F | HEART RATE: 96 BPM | DIASTOLIC BLOOD PRESSURE: 78 MMHG | RESPIRATION RATE: 18 BRPM | BODY MASS INDEX: 28.96 KG/M2 | OXYGEN SATURATION: 97 %

## 2023-06-08 DIAGNOSIS — Z23 NEED FOR VACCINE FOR DT (DIPHTHERIA-TETANUS): ICD-10-CM

## 2023-06-08 DIAGNOSIS — S51.011A ELBOW LACERATION, RIGHT, INITIAL ENCOUNTER: Primary | ICD-10-CM

## 2023-06-08 DIAGNOSIS — S51.011A ELBOW LACERATION, RIGHT, INITIAL ENCOUNTER: ICD-10-CM

## 2023-06-08 PROCEDURE — 99213 OFFICE O/P EST LOW 20 MIN: CPT | Mod: 25 | Performed by: INTERNAL MEDICINE

## 2023-06-08 PROCEDURE — 90714 TD VACC NO PRESV 7 YRS+ IM: CPT | Performed by: INTERNAL MEDICINE

## 2023-06-08 PROCEDURE — 73070 X-RAY EXAM OF ELBOW: CPT | Mod: TC | Performed by: RADIOLOGY

## 2023-06-08 PROCEDURE — 90471 IMMUNIZATION ADMIN: CPT | Performed by: INTERNAL MEDICINE

## 2023-06-08 RX ORDER — DOXYCYCLINE 100 MG/1
100 CAPSULE ORAL 2 TIMES DAILY
Qty: 20 CAPSULE | Refills: 0 | Status: SHIPPED | OUTPATIENT
Start: 2023-06-08 | End: 2023-06-18
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
## 2023-06-09 ENCOUNTER — OFFICE VISIT (OUTPATIENT)
Dept: SURGERY | Facility: CLINIC | Age: 53
End: 2023-06-09
Attending: INTERNAL MEDICINE
Payer: COMMERCIAL

## 2023-06-09 DIAGNOSIS — S51.011A ELBOW LACERATION, RIGHT, INITIAL ENCOUNTER: ICD-10-CM

## 2023-06-09 PROCEDURE — 10120 INC&RMVL FB SUBQ TISS SMPL: CPT | Performed by: SURGERY

## 2023-06-09 NOTE — PROCEDURES
The skin around the right antecubital wound was prepped with alcohol before being injected with 5 cc of local anesthetic.  A 1 cm incision was then made directly over the apex of the fluctuant area of skin.  In doing so the splinter basically jumped out at us and was removed.  There was not any significant underlying abscess or cavity.  The wound was covered with dry gauze and wrapped.    Broderick He MD, FACS  [phone]  [facility]  General and Bariatric Surgery

## 2023-06-09 NOTE — LETTER
6/9/2023         RE: Woody Clancy  [address] [place] [address]        Dear Colleague,    Thank you for referring your patient, Woody Clancy, to the [facility] AND BAR[facility]ATR[facility]. Please see a copy of my visit note below.    See procedure note      Again, thank you for allowing me to participate in the care of your patient.        Sincerely,        Broderick He MD

## 2023-07-25 ENCOUNTER — ANESTHESIA EVENT (OUTPATIENT)
Dept: SURGERY | Facility: AMBULATORY SURGERY CENTER | Age: 53
End: 2023-07-25
Payer: COMMERCIAL

## 2023-07-26 ENCOUNTER — ANESTHESIA (OUTPATIENT)
Dept: SURGERY | Facility: AMBULATORY SURGERY CENTER | Age: 53
End: 2023-07-26
Payer: COMMERCIAL

## 2023-07-26 ENCOUNTER — HOSPITAL ENCOUNTER (OUTPATIENT)
Facility: AMBULATORY SURGERY CENTER | Age: 53
Discharge: HOME | End: 2023-07-26
Attending: COLON & RECTAL SURGERY
Payer: COMMERCIAL

## 2023-07-26 VITALS
HEART RATE: 49 BPM | OXYGEN SATURATION: 97 % | RESPIRATION RATE: 16 BRPM | SYSTOLIC BLOOD PRESSURE: 112 MMHG | BODY MASS INDEX: 28.93 KG/M2 | DIASTOLIC BLOOD PRESSURE: 73 MMHG | WEIGHT: 250 LBS | TEMPERATURE: 97.7 F | HEIGHT: 78 IN

## 2023-07-26 DIAGNOSIS — Z86.0100 PERSONAL HISTORY OF COLONIC POLYPS: ICD-10-CM

## 2023-07-26 LAB — COLONOSCOPY: NORMAL

## 2023-07-26 RX ORDER — ONDANSETRON 4 MG/1
4 TABLET, ORALLY DISINTEGRATING ORAL
Status: DISCONTINUED | OUTPATIENT
Start: 2023-07-26 | End: 2023-07-27 | Stop reason: HOSPADM

## 2023-07-26 RX ORDER — PROPOFOL 10 MG/ML
INJECTION, EMULSION INTRAVENOUS CONTINUOUS PRN
Status: DISCONTINUED | OUTPATIENT
Start: 2023-07-26 | End: 2023-07-26

## 2023-07-26 RX ORDER — ONDANSETRON 2 MG/ML
4 INJECTION INTRAMUSCULAR; INTRAVENOUS EVERY 30 MIN PRN
Status: DISCONTINUED | OUTPATIENT
Start: 2023-07-26 | End: 2023-07-27 | Stop reason: HOSPADM

## 2023-07-26 RX ORDER — LIDOCAINE 40 MG/G
CREAM TOPICAL
Status: DISCONTINUED | OUTPATIENT
Start: 2023-07-26 | End: 2023-07-27 | Stop reason: HOSPADM

## 2023-07-26 RX ORDER — OXYCODONE HYDROCHLORIDE 10 MG/1
10 TABLET ORAL
Status: DISCONTINUED | OUTPATIENT
Start: 2023-07-26 | End: 2023-07-27 | Stop reason: HOSPADM

## 2023-07-26 RX ORDER — ONDANSETRON 4 MG/1
4 TABLET, ORALLY DISINTEGRATING ORAL EVERY 30 MIN PRN
Status: DISCONTINUED | OUTPATIENT
Start: 2023-07-26 | End: 2023-07-27 | Stop reason: HOSPADM

## 2023-07-26 RX ORDER — OXYCODONE HYDROCHLORIDE 5 MG/1
5 TABLET ORAL
Status: DISCONTINUED | OUTPATIENT
Start: 2023-07-26 | End: 2023-07-27 | Stop reason: HOSPADM

## 2023-07-26 RX ORDER — PROPOFOL 10 MG/ML
INJECTION, EMULSION INTRAVENOUS PRN
Status: DISCONTINUED | OUTPATIENT
Start: 2023-07-26 | End: 2023-07-26

## 2023-07-26 RX ORDER — SODIUM CHLORIDE, SODIUM LACTATE, POTASSIUM CHLORIDE, CALCIUM CHLORIDE 600; 310; 30; 20 MG/100ML; MG/100ML; MG/100ML; MG/100ML
INJECTION, SOLUTION INTRAVENOUS CONTINUOUS PRN
Status: DISCONTINUED | OUTPATIENT
Start: 2023-07-26 | End: 2023-07-26

## 2023-07-26 RX ORDER — SODIUM CHLORIDE, SODIUM LACTATE, POTASSIUM CHLORIDE, CALCIUM CHLORIDE 600; 310; 30; 20 MG/100ML; MG/100ML; MG/100ML; MG/100ML
INJECTION, SOLUTION INTRAVENOUS CONTINUOUS
Status: DISCONTINUED | OUTPATIENT
Start: 2023-07-26 | End: 2023-07-27 | Stop reason: HOSPADM

## 2023-07-26 RX ORDER — LIDOCAINE HYDROCHLORIDE 20 MG/ML
INJECTION, SOLUTION INFILTRATION; PERINEURAL PRN
Status: DISCONTINUED | OUTPATIENT
Start: 2023-07-26 | End: 2023-07-26

## 2023-07-26 RX ADMIN — SODIUM CHLORIDE, SODIUM LACTATE, POTASSIUM CHLORIDE, CALCIUM CHLORIDE: 600; 310; 30; 20 INJECTION, SOLUTION INTRAVENOUS at 08:22

## 2023-07-26 RX ADMIN — PROPOFOL 100 MG: 10 INJECTION, EMULSION INTRAVENOUS at 09:27

## 2023-07-26 RX ADMIN — SODIUM CHLORIDE, SODIUM LACTATE, POTASSIUM CHLORIDE, CALCIUM CHLORIDE: 600; 310; 30; 20 INJECTION, SOLUTION INTRAVENOUS at 09:26

## 2023-07-26 RX ADMIN — LIDOCAINE HYDROCHLORIDE 2 ML: 20 INJECTION, SOLUTION INFILTRATION; PERINEURAL at 09:26

## 2023-07-26 RX ADMIN — PROPOFOL 200 MCG/KG/MIN: 10 INJECTION, EMULSION INTRAVENOUS at 09:27

## 2023-07-26 ASSESSMENT — ENCOUNTER SYMPTOMS
DYSRHYTHMIAS: 0
SEIZURES: 0

## 2023-07-26 ASSESSMENT — COPD QUESTIONNAIRES: COPD: 0

## 2023-07-26 ASSESSMENT — LIFESTYLE VARIABLES: TOBACCO_USE: 1

## 2023-07-26 NOTE — ANESTHESIA POSTPROCEDURE EVALUATION
Patient: Woody Clancy    Procedure: Procedure(s):  COLONOSCOPY WITH POLYPECTOMY       Anesthesia Type:  MAC    Note:  Disposition: Outpatient   Postop Pain Control: Uneventful            Sign Out: Well controlled pain   PONV: No   Neuro/Psych: Uneventful            Sign Out: Acceptable/Baseline neuro status   Airway/Respiratory: Uneventful            Sign Out: Acceptable/Baseline resp. status   CV/Hemodynamics: Uneventful            Sign Out: Acceptable CV status; No obvious hypovolemia; No obvious fluid overload   Other NRE: NONE   DID A NON-ROUTINE EVENT OCCUR? No           Last vitals:  Vitals Value Taken Time   /73 07/26/23 1046   Temp 97.7  F (36.5  C) 07/26/23 1029   Pulse 47 07/26/23 1052   Resp 16 07/26/23 1045   SpO2 98 % 07/26/23 1052   Vitals shown include unvalidated device data.    Electronically Signed By: Anahy Cardona MD  July 26, 2023  1:07 PM
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
## 2023-07-26 NOTE — ANESTHESIA CARE TRANSFER NOTE
Patient: Woody Clancy    Procedure: Procedure(s):  COLONOSCOPY WITH POLYPECTOMY       Diagnosis: Personal history of colonic polyps [Z86.010]  Diagnosis Additional Information: No value filed.    Anesthesia Type:   MAC     Note:    Oropharynx: oropharynx clear of all foreign objects and spontaneously breathing  Level of Consciousness: awake  Oxygen Supplementation: room air    Independent Airway: airway patency satisfactory and stable  Dentition: dentition unchanged  Vital Signs Stable: post-procedure vital signs reviewed and stable  Report to RN Given: handoff report given  Patient transferred to: Phase II    Handoff Report: Identifed the Patient, Identified the Reponsible Provider, Reviewed the pertinent medical history, Discussed the surgical course, Reviewed Intra-OP anesthesia mangement and issues during anesthesia, Set expectations for post-procedure period and Allowed opportunity for questions and acknowledgement of understanding      Vitals:  Vitals Value Taken Time   /88 7/26/23   1030   Temp 98 7/26/23   1030   Pulse 51 7/26/23   1030   Resp 15 7/26/23   1030   SpO2 96 7/26/23   1030        Electronically Signed By: MORAIMA Alatorre CRNA  July 26, 2023  10:31 AM

## 2023-07-26 NOTE — DISCHARGE INSTRUCTIONS
If you have any questions or concerns regarding your procedure, please contact Dr. Romero, his office number is [phone].    Post-Colonoscopy Discharge Instructions:    1. You have just had an examination of your colon (large intestine) called a colonoscopy. You should have a full report of the findings to take with you today. It will list if any polyps were removed or if any biopsies were taken to send to the laboratory. If we did take out polyps or do biopsies, you should get a letter in the next 1-2 weeks if they are the standard pre-cancerous polyps. If it is anything more serious, your doctor will call you. The timing of your next colonoscopy is determined by your family history of polyps/colon cancer and what findings were on your colonoscopy. [facility] will keep track of when you are due for your next colonoscopy and contact you.     2. No driving or operating heavy power equipment today.    3. Do not drink alcohol for 12 hours after the procedure.     4. Do not sign any important or legally binding papers today.    5. No strenuous activity today (its a great day for a nap and to lay on the couch and watch some TV!).     6. You can eat whatever you like after your procedure.     7. You may experience drowsiness or forgetfulness at first. You may also notice altered bowel habits, excessive gas and belching or bloated feeling.    8. If recommended by your physician, you should avoid Aspirin, Aspirin products and Arthritis medications for 7 - 10 days following the exam if biopsies or polypectomies   have been performed.    9. You may develop soreness or redness where your IV medication was given.  Apply warm wet cloths to the area for 15 minutes 3 - 4 times per day.    10. You may have a bloated, gaseous feeling in your abdomen after a Colonoscopy for the next few hours. Passing gas and belching will help. Walking or lying down on your left side with your knees flexed may relieve the  discomfort.    11. Call the office at [phone] right away if you notice any of the following:    a. Vomiting of blood and/or  coffee ground  material.  b. Rectal bleeding - >1 Tbsp, blood clots, or continuous bleeding.  c. Severe abdominal pain.  d. Chills, fever over 101.5 degrees F.  e. Persistent nausea or vomiting.  f. Persistent disorientation/confusion.  g. Persistent coughing or spitting up blood.  h. Persistent redness, hardness, or tenderness at IV site.    Discharge Instructions: After Your Surgery, regarding Anesthesia    You ve just had surgery. During surgery, you were given medicine called anesthesia to keep you relaxed and free of pain. After surgery, you may have some pain or nausea. This is common. Here are some tips for feeling better and getting well after surgery.    Going home    Your healthcare provider will show you how to take care of yourself when you go home. He or she will also answer your questions. Have an adult family member or friend drive you home. For the first 24 hours after your surgery:    Don't drive or use heavy equipment.  Don't make important decisions or sign legal papers.  Don't drink alcohol.  Have an adult stay with you for the next 24 hours. He or she can watch for problems and help keep you safe.  Be sure to go to all follow-up visits with your healthcare provider. And rest after your surgery for as long as your healthcare provider tells you to.    Managing nausea    Some people have an upset stomach after surgery. This is often because of anesthesia, pain, or pain medicine, or the stress of surgery. These tips will help you handle nausea and eat healthy foods as you get better. If you were on a special food plan before surgery, ask your healthcare provider if you should follow it while you get better. These tips may help:    Don't push yourself to eat. Your body will tell you when to eat and how much.  Start off with clear liquids and soup. They are easier to  digest.  Next try semi-solid foods, such as mashed potatoes, applesauce, and gelatin, as you feel ready.  Slowly move to solid foods. Don t eat fatty, rich, or spicy foods at first.  Don't force yourself to have 3 large meals a day. Instead eat smaller amounts more often.  Take pain medicines with a small amount of solid food, such as crackers or toast, to prevent nausea.    If you have obstructive sleep apnea    You were given anesthesia medicine during surgery to keep you comfortable and free of pain. After surgery, you may have more apnea spells because of this medicine and other medicines you were given. The spells may last longer than usual.   At home:    Keep using the continuous positive airway pressure (CPAP) device when you sleep. Unless your healthcare provider tells you not to, use it when you sleep, day or night. CPAP is a common device used to treat obstructive sleep apnea.  Talk with your provider before taking any pain medicine, muscle relaxants, or sedatives. Your provider will tell you about the possible dangers of taking these medicines.

## 2023-07-27 ENCOUNTER — NURSE TRIAGE (OUTPATIENT)
Dept: INTERNAL MEDICINE | Facility: CLINIC | Age: 53
End: 2023-07-27
Payer: COMMERCIAL

## 2023-07-27 NOTE — TELEPHONE ENCOUNTER
Patient called concerned with new onset lightheadedness. Onset of intermit lightheadedness 7-10 days ago. Patient stated is mostly happens when he is doing something physical then stands up. Patient is taking any medication and does not check BP at home. Denied chest pain, racing HR or palpitations, vision changes, dizziness, nausea/vomiting. He has has 4 moderate headache within this time frame, this frequency is new for patient. Denied s/s of dehydration.     He is going out of town soon and would like to be seen for evaluation. He is going to call back to schedule with pcp tomorrow. There is availability. Please assist to schedule patient when he calls back .     Reason for Disposition   Patient wants to be seen    Additional Information   Negative: SEVERE difficulty breathing (e.g., struggling for each breath, speaks in single words)   Negative: Shock suspected (e.g., cold/pale/clammy skin, too weak to stand, low BP, rapid pulse)   Negative: Difficult to awaken or acting confused (e.g., disoriented, slurred speech)   Negative: Fainted, and still feels dizzy afterwards   Negative: Overdose (accidental or intentional) of medications   Negative: New neurologic deficit that is present now: * Weakness of the face, arm, or leg on one side of the body * Numbness of the face, arm, or leg on one side of the body * Loss of speech or garbled speech   Negative: Heart beating < 50 beats per minute OR > 140 beats per minute   Negative: Sounds like a life-threatening emergency to the triager   Negative: Chest pain   Negative: Rectal bleeding, bloody stool, or tarry-black stool   Negative: Vomiting is main symptom   Negative: Diarrhea is main symptom   Negative: Headache is main symptom   Negative: Heat exhaustion suspected (i.e., dehydration from heat exposure)   Negative: Patient states that they are having an anxiety or panic attack   Negative: Dizziness from low blood sugar (i.e., < 60 mg/dl or 3.5 mmol/l)   Negative:  SEVERE dizziness (e.g., unable to stand, requires support to walk, feels like passing out now)   Negative: SEVERE headache or neck pain   Negative: Spinning or tilting sensation (vertigo) present now and one or more stroke risk factors (i.e., hypertension, diabetes mellitus, prior stroke/TIA, heart attack, age over 60) (Exception: prior physician evaluation for this AND no different/worse than usual)   Negative: Neurologic deficit that was brief (now gone), ANY of the following:* Weakness of the face, arm, or leg on one side of the body* Numbness of the face, arm, or leg on one side of the body* Loss of speech or garbled speech   Negative: Loss of vision or double vision  (Exception: Similar to previous migraines.)   Negative: Extra heart beats OR irregular heart beating (i.e., 'palpitations')   Negative: Difficulty breathing   Negative: Drinking very little and has signs of dehydration (e.g., no urine > 12 hours, very dry mouth, very lightheaded)   Negative: Follows bleeding (e.g., stomach, rectum, vagina)  (Exception: Became dizzy from sight of small amount blood.)   Negative: Patient sounds very sick or weak to the triager   Negative: Lightheadedness (dizziness) present now, after 2 hours of rest and fluids   Negative: Spinning or tilting sensation (vertigo) present now   Negative: Fever > 103 F (39.4 C)   Negative: Fever > 100.0 F (37.8 C) and has diabetes mellitus or a weak immune system (e.g., HIV positive, cancer chemotherapy, organ transplant, splenectomy, chronic steroids)   Negative: MODERATE dizziness (e.g., interferes with normal activities) (Exception: dizziness caused by heat exposure, sudden standing, or poor fluid intake)   Negative: Vomiting occurs with dizziness    Protocols used: Dizziness-A-OH

## 2024-02-08 ENCOUNTER — PATIENT OUTREACH (OUTPATIENT)
Dept: GASTROENTEROLOGY | Facility: CLINIC | Age: 54
End: 2024-02-08
Payer: COMMERCIAL

## 2024-05-12 ENCOUNTER — HEALTH MAINTENANCE LETTER (OUTPATIENT)
Age: 54
End: 2024-05-12

## 2024-06-11 ENCOUNTER — MYC MEDICAL ADVICE (OUTPATIENT)
Dept: INTERNAL MEDICINE | Facility: CLINIC | Age: 54
End: 2024-06-11
Payer: COMMERCIAL

## 2024-06-11 DIAGNOSIS — Z13.1 SCREENING FOR DIABETES MELLITUS: ICD-10-CM

## 2024-06-11 DIAGNOSIS — Z00.00 ROUTINE GENERAL MEDICAL EXAMINATION AT A HEALTH CARE FACILITY: Primary | ICD-10-CM

## 2024-06-11 DIAGNOSIS — E78.5 HYPERLIPIDEMIA LDL GOAL <100: ICD-10-CM

## 2024-06-11 DIAGNOSIS — Z12.5 SCREENING PSA (PROSTATE SPECIFIC ANTIGEN): ICD-10-CM

## 2024-08-08 ENCOUNTER — LAB (OUTPATIENT)
Dept: LAB | Facility: CLINIC | Age: 54
End: 2024-08-08
Payer: COMMERCIAL

## 2024-08-08 DIAGNOSIS — Z00.00 ROUTINE GENERAL MEDICAL EXAMINATION AT A HEALTH CARE FACILITY: ICD-10-CM

## 2024-08-08 DIAGNOSIS — E78.5 HYPERLIPIDEMIA LDL GOAL <100: ICD-10-CM

## 2024-08-08 DIAGNOSIS — Z13.1 SCREENING FOR DIABETES MELLITUS: ICD-10-CM

## 2024-08-08 DIAGNOSIS — Z12.5 SCREENING PSA (PROSTATE SPECIFIC ANTIGEN): ICD-10-CM

## 2024-08-08 LAB
ALBUMIN SERPL BCG-MCNC: 4.4 G/DL (ref 3.5–5.2)
ALP SERPL-CCNC: 58 U/L (ref 40–150)
ALT SERPL W P-5'-P-CCNC: 84 U/L (ref 0–70)
ANION GAP SERPL CALCULATED.3IONS-SCNC: 10 MMOL/L (ref 7–15)
AST SERPL W P-5'-P-CCNC: 83 U/L (ref 0–45)
BILIRUB SERPL-MCNC: 0.6 MG/DL
BUN SERPL-MCNC: 18 MG/DL (ref 6–20)
CALCIUM SERPL-MCNC: 9.2 MG/DL (ref 8.8–10.4)
CHLORIDE SERPL-SCNC: 103 MMOL/L (ref 98–107)
CHOLEST SERPL-MCNC: 274 MG/DL
CREAT SERPL-MCNC: 0.87 MG/DL (ref 0.67–1.17)
EGFRCR SERPLBLD CKD-EPI 2021: >90 ML/MIN/1.73M2
ERYTHROCYTE [DISTWIDTH] IN BLOOD BY AUTOMATED COUNT: 12.3 % (ref 10–15)
FASTING STATUS PATIENT QL REPORTED: YES
FASTING STATUS PATIENT QL REPORTED: YES
GLUCOSE SERPL-MCNC: 98 MG/DL (ref 70–99)
HBA1C MFR BLD: 5.7 % (ref 0–5.6)
HCO3 SERPL-SCNC: 25 MMOL/L (ref 22–29)
HCT VFR BLD AUTO: 38.4 % (ref 40–53)
HDLC SERPL-MCNC: 80 MG/DL
HGB BLD-MCNC: 13 G/DL (ref 13.3–17.7)
LDLC SERPL CALC-MCNC: 174 MG/DL
MCH RBC QN AUTO: 31.5 PG (ref 26.5–33)
MCHC RBC AUTO-ENTMCNC: 33.9 G/DL (ref 31.5–36.5)
MCV RBC AUTO: 93 FL (ref 78–100)
NONHDLC SERPL-MCNC: 194 MG/DL
PLATELET # BLD AUTO: 212 10E3/UL (ref 150–450)
POTASSIUM SERPL-SCNC: 4.2 MMOL/L (ref 3.4–5.3)
PROT SERPL-MCNC: 7.3 G/DL (ref 6.4–8.3)
PSA SERPL DL<=0.01 NG/ML-MCNC: 0.49 NG/ML (ref 0–3.5)
RBC # BLD AUTO: 4.13 10E6/UL (ref 4.4–5.9)
SODIUM SERPL-SCNC: 138 MMOL/L (ref 135–145)
T4 FREE SERPL-MCNC: 1.33 NG/DL (ref 0.9–1.7)
TRIGL SERPL-MCNC: 99 MG/DL
TSH SERPL DL<=0.005 MIU/L-ACNC: 4.87 UIU/ML (ref 0.3–4.2)
WBC # BLD AUTO: 4.8 10E3/UL (ref 4–11)

## 2024-08-08 PROCEDURE — 85027 COMPLETE CBC AUTOMATED: CPT

## 2024-08-08 PROCEDURE — 83036 HEMOGLOBIN GLYCOSYLATED A1C: CPT

## 2024-08-08 PROCEDURE — 36415 COLL VENOUS BLD VENIPUNCTURE: CPT

## 2024-08-08 PROCEDURE — 84443 ASSAY THYROID STIM HORMONE: CPT

## 2024-08-08 PROCEDURE — 80061 LIPID PANEL: CPT

## 2024-08-08 PROCEDURE — 84439 ASSAY OF FREE THYROXINE: CPT

## 2024-08-08 PROCEDURE — G0103 PSA SCREENING: HCPCS

## 2024-08-08 PROCEDURE — 80053 COMPREHEN METABOLIC PANEL: CPT

## 2024-08-18 SDOH — HEALTH STABILITY: PHYSICAL HEALTH: ON AVERAGE, HOW MANY MINUTES DO YOU ENGAGE IN EXERCISE AT THIS LEVEL?: 40 MIN

## 2024-08-18 SDOH — HEALTH STABILITY: PHYSICAL HEALTH: ON AVERAGE, HOW MANY DAYS PER WEEK DO YOU ENGAGE IN MODERATE TO STRENUOUS EXERCISE (LIKE A BRISK WALK)?: 3 DAYS

## 2024-08-18 ASSESSMENT — SOCIAL DETERMINANTS OF HEALTH (SDOH): HOW OFTEN DO YOU GET TOGETHER WITH FRIENDS OR RELATIVES?: ONCE A WEEK

## 2024-08-22 ENCOUNTER — OFFICE VISIT (OUTPATIENT)
Dept: INTERNAL MEDICINE | Facility: CLINIC | Age: 54
End: 2024-08-22
Payer: COMMERCIAL

## 2024-08-22 VITALS
DIASTOLIC BLOOD PRESSURE: 70 MMHG | OXYGEN SATURATION: 95 % | HEART RATE: 50 BPM | SYSTOLIC BLOOD PRESSURE: 116 MMHG | WEIGHT: 249 LBS | BODY MASS INDEX: 28.81 KG/M2 | TEMPERATURE: 97.8 F | RESPIRATION RATE: 16 BRPM | HEIGHT: 78 IN

## 2024-08-22 DIAGNOSIS — Z00.00 ROUTINE GENERAL MEDICAL EXAMINATION AT A HEALTH CARE FACILITY: Primary | ICD-10-CM

## 2024-08-22 DIAGNOSIS — E78.5 HYPERLIPIDEMIA LDL GOAL <100: ICD-10-CM

## 2024-08-22 DIAGNOSIS — Z12.5 SCREENING PSA (PROSTATE SPECIFIC ANTIGEN): ICD-10-CM

## 2024-08-22 DIAGNOSIS — Z86.0100 PERSONAL HISTORY OF COLONIC POLYPS: ICD-10-CM

## 2024-08-22 DIAGNOSIS — R79.89 ABNORMAL LFTS: ICD-10-CM

## 2024-08-22 DIAGNOSIS — R73.03 PREDIABETES: ICD-10-CM

## 2024-08-22 DIAGNOSIS — Z85.47 PERSONAL HISTORY OF TESTICULAR CANCER: ICD-10-CM

## 2024-08-22 PROCEDURE — 99396 PREV VISIT EST AGE 40-64: CPT | Performed by: INTERNAL MEDICINE
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
## 2025-07-23 ENCOUNTER — PATIENT OUTREACH (OUTPATIENT)
Dept: CARE COORDINATION | Facility: CLINIC | Age: 55
End: 2025-07-23
Payer: COMMERCIAL

## 2025-08-04 ENCOUNTER — MYC MEDICAL ADVICE (OUTPATIENT)
Dept: INTERNAL MEDICINE | Facility: CLINIC | Age: 55
End: 2025-08-04
Payer: COMMERCIAL

## 2025-08-04 DIAGNOSIS — Z85.47 PERSONAL HISTORY OF TESTICULAR CANCER: Primary | ICD-10-CM

## 2025-08-04 DIAGNOSIS — Z13.1 SCREENING FOR DIABETES MELLITUS: ICD-10-CM

## 2025-08-04 DIAGNOSIS — Z12.5 SCREENING PSA (PROSTATE SPECIFIC ANTIGEN): ICD-10-CM

## 2025-08-04 DIAGNOSIS — E78.5 HYPERLIPIDEMIA LDL GOAL <100: ICD-10-CM

## 2025-08-04 DIAGNOSIS — Z00.00 ROUTINE GENERAL MEDICAL EXAMINATION AT A HEALTH CARE FACILITY: ICD-10-CM

## 2025-09-02 SDOH — HEALTH STABILITY: PHYSICAL HEALTH: ON AVERAGE, HOW MANY DAYS PER WEEK DO YOU ENGAGE IN MODERATE TO STRENUOUS EXERCISE (LIKE A BRISK WALK)?: 4 DAYS

## 2025-09-02 SDOH — HEALTH STABILITY: PHYSICAL HEALTH: ON AVERAGE, HOW MANY MINUTES DO YOU ENGAGE IN EXERCISE AT THIS LEVEL?: 50 MIN

## 2025-09-03 ENCOUNTER — OFFICE VISIT (OUTPATIENT)
Dept: INTERNAL MEDICINE | Facility: CLINIC | Age: 55
End: 2025-09-03
Payer: COMMERCIAL

## 2025-09-03 VITALS
HEART RATE: 50 BPM | OXYGEN SATURATION: 98 % | BODY MASS INDEX: 27.94 KG/M2 | HEIGHT: 78 IN | SYSTOLIC BLOOD PRESSURE: 122 MMHG | WEIGHT: 241.5 LBS | RESPIRATION RATE: 16 BRPM | DIASTOLIC BLOOD PRESSURE: 74 MMHG | TEMPERATURE: 98 F

## 2025-09-03 DIAGNOSIS — Z12.5 SCREENING PSA (PROSTATE SPECIFIC ANTIGEN): ICD-10-CM

## 2025-09-03 DIAGNOSIS — E03.9 ACQUIRED HYPOTHYROIDISM: ICD-10-CM

## 2025-09-03 DIAGNOSIS — Z00.00 ROUTINE GENERAL MEDICAL EXAMINATION AT A HEALTH CARE FACILITY: Primary | ICD-10-CM

## 2025-09-03 DIAGNOSIS — E78.5 HYPERLIPIDEMIA LDL GOAL <100: ICD-10-CM

## 2025-09-03 DIAGNOSIS — Z85.47 PERSONAL HISTORY OF TESTICULAR CANCER: ICD-10-CM

## 2025-09-03 PROCEDURE — 3074F SYST BP LT 130 MM HG: CPT | Performed by: INTERNAL MEDICINE

## 2025-09-03 PROCEDURE — 3050F LDL-C >= 130 MG/DL: CPT | Performed by: INTERNAL MEDICINE

## 2025-09-03 PROCEDURE — 3078F DIAST BP <80 MM HG: CPT | Performed by: INTERNAL MEDICINE

## 2025-09-03 PROCEDURE — 99396 PREV VISIT EST AGE 40-64: CPT | Performed by: INTERNAL MEDICINE

## 2025-09-03 RX ORDER — LEVOTHYROXINE SODIUM 50 UG/1
50 TABLET ORAL DAILY
Qty: 90 TABLET | Refills: 3 | Status: SHIPPED | OUTPATIENT
Start: 2025-09-03